# Patient Record
Sex: FEMALE | Race: WHITE | NOT HISPANIC OR LATINO | Employment: UNEMPLOYED | URBAN - METROPOLITAN AREA
[De-identification: names, ages, dates, MRNs, and addresses within clinical notes are randomized per-mention and may not be internally consistent; named-entity substitution may affect disease eponyms.]

---

## 2024-05-30 DIAGNOSIS — D24.2 BREAST ADENOMA, LEFT: ICD-10-CM

## 2024-05-30 DIAGNOSIS — N64.4 BREAST PAIN IN FEMALE: Primary | ICD-10-CM

## 2024-06-18 ENCOUNTER — OFFICE VISIT (OUTPATIENT)
Dept: SURGERY | Facility: CLINIC | Age: 44
End: 2024-06-18
Payer: COMMERCIAL

## 2024-06-18 VITALS
HEIGHT: 66 IN | WEIGHT: 154.38 LBS | BODY MASS INDEX: 24.81 KG/M2 | RESPIRATION RATE: 16 BRPM | HEART RATE: 66 BPM | DIASTOLIC BLOOD PRESSURE: 75 MMHG | OXYGEN SATURATION: 100 % | SYSTOLIC BLOOD PRESSURE: 108 MMHG | TEMPERATURE: 98 F

## 2024-06-18 DIAGNOSIS — N64.51 HARDNESS OF BREAST: ICD-10-CM

## 2024-06-18 DIAGNOSIS — Z98.890 HISTORY OF AUGMENTATION OF BOTH BREASTS: ICD-10-CM

## 2024-06-18 DIAGNOSIS — D24.2 BREAST ADENOMA, LEFT: Primary | ICD-10-CM

## 2024-06-18 DIAGNOSIS — N64.4 BREAST PAIN IN FEMALE: ICD-10-CM

## 2024-06-18 PROCEDURE — 1159F MED LIST DOCD IN RCRD: CPT | Mod: CPTII,S$GLB,, | Performed by: PHYSICIAN ASSISTANT

## 2024-06-18 PROCEDURE — 3078F DIAST BP <80 MM HG: CPT | Mod: CPTII,S$GLB,, | Performed by: PHYSICIAN ASSISTANT

## 2024-06-18 PROCEDURE — 99999 PR PBB SHADOW E&M-EST. PATIENT-LVL IV: CPT | Mod: PBBFAC,,, | Performed by: PHYSICIAN ASSISTANT

## 2024-06-18 PROCEDURE — 3008F BODY MASS INDEX DOCD: CPT | Mod: CPTII,S$GLB,, | Performed by: PHYSICIAN ASSISTANT

## 2024-06-18 PROCEDURE — 1160F RVW MEDS BY RX/DR IN RCRD: CPT | Mod: CPTII,S$GLB,, | Performed by: PHYSICIAN ASSISTANT

## 2024-06-18 PROCEDURE — 3074F SYST BP LT 130 MM HG: CPT | Mod: CPTII,S$GLB,, | Performed by: PHYSICIAN ASSISTANT

## 2024-06-18 PROCEDURE — 99205 OFFICE O/P NEW HI 60 MIN: CPT | Mod: S$GLB,,, | Performed by: PHYSICIAN ASSISTANT

## 2024-06-18 NOTE — PROGRESS NOTES
[facility] Breast Surg  Breast Surgical Oncology  New Patient Office Visit - H&P      Referring Provider: Dr. MARIA DEL ROSARIO Riddle*  PCP: Yasir Vo MD   Care Team:  Medical Oncologist: No care team member to display   Radiation Oncologist: No care team member to display   OBGYN: No data on file.    Chief Complaint:   Chief Complaint   Patient presents with    Breast Pain     Patient c/o intermittent dull left breast pain x 5 years no redness, swelling, no nipple discharge          Subjective:     HPI:  Asia Sen is a 43 y.o. female who presents on 2024 for evaluation of  a left breast lump and pain . Her symptoms first began in  when she developed a left breast lump while pregnant. The lump was evaluated in 2019, revealing 2 masses in the area of palpable concern, the largest measuring 3.3 cm.  The findings were felt to be most consistent with a lactating adenoma or fibroadenoma.  She continue close interval follow-up until 2019 when the masses were seen to have decreased in size, with the largest now measuring 2.1 cm.  Findings were deemed benign, and she has since continued mammograms and ultrasounds have been negative.  However, she feels that within the last year the lump has increased in size and complains of worsening left breast pain with associated generalized hardness to the entire left breast. Her breast history also includes bilateral breast augmentation with saline implants in . These have never been replaced. Other than the left breast lump, pain, and new generalized hardness to the breast, she currently denies any other breast issues including rashes, redness, swelling, or nipple discharge.    Imagin2019 BL DG MG and BL breast US at [facility] (patient was 39 weeks pregnant at this time) - BIRADS 3:   On mammogram in the subareolar left breast 6 o'clock position there is a 4.1 cm circumscribed ovoid mass.  On ultrasound, there is  a 3.3 x 2.1 x 3.1 cm fairly circumscribed oval hypoechoic mass with a small amount of internal cystic change.  Differential considerations include a lactating adenoma or fibroadenoma.  There is a similar adjacent 1.2 x 0.7 x 1.1 cm circumscribed oval hypoechoic mass.  These are probably benign.  Follow-up breast ultrasound is recommended in 6 weeks once patient is postpartum.  3/28/2019 BL breast US at [facility] - BIRADS 3:    At 5:00, there has been interval decrease in size of a mildly complex hypoechoic mass which now measures 2.8 x 1.2 x 2.8 cm.  This is likely a fibroadenoma.  There has also been interval decrease in size of the adjacent circumscribed hypoechoic mass which now measures 0.9 x 0.5 x 0.7 cm.  This is also likely a fibroadenoma.  Despite the decrease in size, continued follow up in 6 months is recommended.  10/1/2019 L breast US at [facility] - BIRADS 2:   Again demonstrates a fibroadenoma in the lower outer left breast 1 cm from the nipple.  On today's study this measures 2.1 x 2.1 x 1.1 cm which is a slight decrease in size when compared with the March study.  A 2nd fibroadenoma adjacent to the larger 1 measures less than 1 cm in greatest dimension.  2/28/2020 SCR MG - BIRADS 1: NEGATIVE  3/1/2021 SCR MG - BIRADS 0: INCOMPLETE:    Ms. Macedo masses which are adjacent to each other located in the left breast at 6:00 are again demonstrated, and they show no significant significant change, except that they are smaller.  However, patient says that the lump in the left breast is getting larger.  Therefore recommend additional imaging with ultrasound.  3/16/2021 L breast US - BIRADS 3:    In area of palpable concern in the 5 o'clock position 1 cm from the nipple, a pair of adjacent masses maintain stable benign characteristics of a fibroadenoma.  There has been some change in volume since the previous evaluations but they remain benign-appearing.  Today the larger mass measures 2.3 x 0.8 x 2.4 cm.  The smaller  measures 1.0 x 0.5 x 0.9 cm.  Follow-up ultrasound is recommended in 6 months.  2021 L breast US - BIRADS 2:    There is no significant change compared to the previous examinations.  The larger mass now measures 2.4 x 0.8 x 2.4 cm.  The smaller mass measures 1.0 x 0.5 x 0.9 cm.  They maintain characteristics of a benign fibroadenoma.  2022 SCR MG - BIRADS 1: NEGATIVE  2023 SCR MG - BIRADS 1: NEGATIVE    Pathology:       OB/GYN History:  Age at Menarche Onset: 13  Menopausal Status: premenopausal, LMP: Patient's last menstrual period was 2024 (exact date).  Hysterectomy/Oophorectomy: no, neither  Hormonal birth control (duration): 15 years total  Pregnancy History:   Age at first live birth: 38  Hormone Replacement Therapy: No, none    Other:  MG breast density: BIRADS B  Prior thoracic RT: none  Genetic testing: none  Ashkenazi [employer]ism descent: No    Family History:  Family History   Problem Relation Name Age of Onset    Cancer Mother Giulia         Kidney Cancer    Hypertension Mother Giulia     Stroke Mother Giulia         TIA (once)    Depression Mother Giulia     Early death Mother Giulia     Heart disease Mother Giulia     Miscarriages / Stillbirths Mother Giulia     Alcohol abuse Father Oseas     Drug abuse Father Oseas     Early death Father Oseas         Patient History:  History reviewed. No pertinent past medical history.    There are no problems to display for this patient.       Past Surgical History:   Procedure Laterality Date    AUGMENTATION OF BREAST  2006    Under Muscle Saline    COSMETIC SURGERY  2006    Breast Implants       Social History     Socioeconomic History    Marital status:    Tobacco Use    Smoking status: Former     Types: Cigarettes    Smokeless tobacco: Never    Tobacco comments:     Occasionally on and off in my 20's and 30's quit at 37   Substance and Sexual Activity    Alcohol use: Not Currently     Comment: Occasionally not  "daily nor weekly    Drug use: Never    Sexual activity: Yes     Partners: Male     Birth control/protection: Partner-Vasectomy     Comment:          There is no immunization history on file for this patient.    Medications/Allergies:  No current outpatient medications on file.     Review of patient's allergies indicates:  Not on File    Review of Systems:  ROS     Objective:     Vitals:  Vitals:    06/18/24 1000   BP: 108/75   BP Location: Right arm   Patient Position: Sitting   BP Method: Medium (Automatic)   Pulse: 66   Resp: 16   Temp: 97.8 °F (36.6 °C)   TempSrc: Oral   SpO2: 100%   Weight: 70 kg (154 lb 6.4 oz)   Height: 5' 6" (1.676 m)       Body mass index is 24.92 kg/m².     Physical Exam:  General: The patient is awake, alert and oriented times three. The patient is well nourished and in no acute distress.  Neck: There is no evidence of palpable cervical, supraclavicular or axillary adenopathy. The neck is supple. The thyroid is not enlarged.  Musculoskeletal: The patient has a normal range of motion of her bilateral upper extremities.  Chest: Examination of the chest wall fails to reveal any obvious abnormalities. Nonlabored breathing, symmetric expansion.  Breast:  Right:  Examination of right breast fails to reveal any dominant masses or areas of significant focal nodularity. The nipple is everted without evidence of discharge. There is no skin dimpling with movement of the pectoralis. There are no significant skin changes overlying the breast.   Left: There is a 3-4 cm firm, freely-mobile lump in the 6:00 periareolar position in the patient's area of palpable concern. There is also generalized hardness of the entire left breast with associated tenderness to palpation. Examination of the left breast fails to reveal any dominant masses or areas of significant focal nodularity. The nipple is everted without evidence of discharge. There is no skin dimpling with movement of the pectoralis. There are " no significant skin changes overlying the breast.  Abdomen: The abdomen is soft, flat, nontender and nondistended.  Integumentary: no rashes or skin lesions present  Neurologic: cranial nerves intact, no signs of peripheral neurological deficit, motor/sensory function intact      Assessment:     There is no problem list on file for this patient.       Asia was seen today for breast pain.    Diagnoses and all orders for this visit:    Breast adenoma, left  -     Ambulatory referral/consult to Breast Surgery  -     US Breast Left Complete; Future  -     Mammo Digital Diagnostic Bilat with Michael; Future  -     US Breast Left Complete  -     Mammo Digital Diagnostic Bilat with Michael    Breast pain in female  -     Ambulatory referral/consult to Breast Surgery  -     US Breast Left Complete; Future  -     Mammo Digital Diagnostic Bilat with Michael; Future  -     US Breast Left Complete  -     Mammo Digital Diagnostic Bilat with Michael    Hardness of breast  -     US Breast Left Complete; Future  -     Mammo Digital Diagnostic Bilat with Michael; Future  -     US Breast Left Complete  -     Mammo Digital Diagnostic Bilat with Michael    History of augmentation of both breasts       Discussed excision of left breast adenoma given it has become increasingly symptomatic and bothersome to patient. Would first recommend breast imaging to re-evaluate this mass which is patient feels to have enlarged and become more tender since her last exam in August 2023.     Regarding generalized hardness to the left breast and pain, I discussed possibility of capsular contracture causing these symptoms. Diagnostic breast imaging may be helpful to evaluate generalized hardness/pain to the left breast concerning for possible implant contracture, but sometimes a breast MRI is needed to fully evaluate for this. Would help to have opinion from plastic surgeon as well.      Plan:      BL DG MG and L breast US at [facility] to re-evaluate left breast mass which is  patient feels to have enlarged and become more tender since her last exam in August 2023. Will also recommend this imaging to evaluate generalized hardness/pain to the left breast concerning for possible implant contracture. Please send office notes with imaging order.    Referral to Dr. Simons for evaluation of possible left implant contracture and consideration for implant exchange (possibly at time of excision of left breast mass).    RTC after imaging for follow up with Dr. Barrera for pre-op.      CC: Dr. Riddle      All of her questions were answered. She was advised to call if she develops any questions or concerns.    Yolanda Lopez PA-C       --------------------------------------------------------------------------------------------------------------  Total time on the date of the visit ranged from 60-74 mins (80169). Total time includes both face-to-face and non-face-to-face time personally spent by myself on the day of the visit.    Non-face-to-face time included:  _X_ preparing to see the patient such as reviewing the patient record  _X_ obtaining and reviewing separately obtained history  _X_ independently interpreting results  _X_ documenting clinical information in electronic health record.    Face-to-face time included:  _X_ performing an appropriate history and examination  _X_ communicating results to the patient  _X_ counseling and educating the patient  __ ordering appropriate medications  _x_ ordering appropriate tests  _X_ ordering appropriate procedures (including follow-up)  _X_ answering any questions the patient had    Total Time spent on date of visit: 70 minutes

## 2024-07-22 DIAGNOSIS — N63.10 MASS OF RIGHT BREAST, UNSPECIFIED QUADRANT: Primary | ICD-10-CM

## 2024-08-26 NOTE — PROGRESS NOTES
[facility] Breast Surg  Breast Surgical Oncology  New Patient Office Visit - H&P      Referring Provider: Dr. MARIA DEL ROSARIO Riddle  PCP: Yasir Vo MD   Care Team:  OBGYN: Dr. MARIA DEL ROSARIO Riddle    Chief Complaint:   Chief Complaint   Patient presents with    Pre-op Exam     Patient reports no breast related concerns         Subjective:     Interval History:  08/27/2024 - Asia Sen returns today for surgical consultation. She has undergone repeat imaging which revealed again a retroareolar mass but that what was previously seen as two breast masses has now converged into one mass. It has enlarged and currently measuring 5.6 cm. This was biopsied and pathology showed a complex fibroadenoma. The mass remains tender and bothersome to the patient.     HPI:  Asia Sen is a 43 y.o. female who presents on 6/18/2024 for evaluation of  a left breast lump and pain . Her symptoms first began in 2019 when she developed a left breast lump while pregnant. The lump was evaluated in February 2019, revealing 2 masses in the area of palpable concern, the largest measuring 3.3 cm.  The findings were felt to be most consistent with a lactating adenoma or fibroadenoma.  She continue close interval follow-up until October 2019 when the masses were seen to have decreased in size, with the largest now measuring 2.1 cm.  Findings were deemed benign, and she has since continued mammograms and ultrasounds have been negative.  However, she feels that within the last year the lump has increased in size and complains of worsening left breast pain with associated generalized hardness to the entire left breast. Her breast history also includes bilateral breast augmentation with saline implants in 2006. These have never been replaced. Other than the left breast lump, pain, and new generalized hardness to the breast, she currently denies any other breast issues including rashes, redness, swelling, or  nipple discharge.    Imagin2019 BL DG MG and BL breast US at [facility] (patient was 39 weeks pregnant at this time) - BIRADS 3:   On mammogram in the subareolar left breast 6 o'clock position there is a 4.1 cm circumscribed ovoid mass.  On ultrasound, there is a 3.3 x 2.1 x 3.1 cm fairly circumscribed oval hypoechoic mass with a small amount of internal cystic change.  Differential considerations include a lactating adenoma or fibroadenoma.  There is a similar adjacent 1.2 x 0.7 x 1.1 cm circumscribed oval hypoechoic mass.  These are probably benign.  Follow-up breast ultrasound is recommended in 6 weeks once patient is postpartum.  3/28/2019 BL breast US at [facility] - BIRADS 3:    At 5:00, there has been interval decrease in size of a mildly complex hypoechoic mass which now measures 2.8 x 1.2 x 2.8 cm.  This is likely a fibroadenoma.  There has also been interval decrease in size of the adjacent circumscribed hypoechoic mass which now measures 0.9 x 0.5 x 0.7 cm.  This is also likely a fibroadenoma.  Despite the decrease in size, continued follow up in 6 months is recommended.  10/1/2019 L breast US at [facility] - BIRADS 2:   Again demonstrates a fibroadenoma in the lower outer left breast 1 cm from the nipple.  On today's study this measures 2.1 x 2.1 x 1.1 cm which is a slight decrease in size when compared with the March study.  A 2nd fibroadenoma adjacent to the larger 1 measures less than 1 cm in greatest dimension.  2020 SCR MG - BIRADS 1: NEGATIVE  3/1/2021 SCR MG - BIRADS 0: INCOMPLETE:    Ms. Macedo masses which are adjacent to each other located in the left breast at 6:00 are again demonstrated, and they show no significant significant change, except that they are smaller.  However, patient says that the lump in the left breast is getting larger.  Therefore recommend additional imaging with ultrasound.  3/16/2021 L breast US - BIRADS 3:    In area of palpable concern in the 5 o'clock position 1 cm from the  "nipple, a pair of adjacent masses maintain stable benign characteristics of a fibroadenoma.  There has been some change in volume since the previous evaluations but they remain benign-appearing.  Today the larger mass measures 2.3 x 0.8 x 2.4 cm.  The smaller measures 1.0 x 0.5 x 0.9 cm.  Follow-up ultrasound is recommended in 6 months.  09/16/2021 L breast US - BIRADS 2:    There is no significant change compared to the previous examinations.  The larger mass now measures 2.4 x 0.8 x 2.4 cm.  The smaller mass measures 1.0 x 0.5 x 0.9 cm.  They maintain characteristics of a benign fibroadenoma.  8/24/2022 SCR MG - BIRADS 1: NEGATIVE  8/25/2023 SCR MG - BIRADS 1: NEGATIVE  8/12/2024 BL DG MG and BL breast US at [facility] - BIRADS 4:   On mammography, there is a lobulated mass in the left breast at 5-6:00 corresponding to the palpable area. It is more accurately measured by US. This mass has grown in size since previous mammograms 8/24/2022 and 8/25/2023. It shows characteristics of a benign fibroadenoma by mammography and US 3/15/2021 and 9/16/2021. There are no other significant findings.  On US of the left breast, there is a multilobulated mass in the left breast centered at 5:00 corresponding to the palpable area of concern. It shows characteristics of a fibroadenoma and measures 5.6 x 1.8 x 3.8 cm. Previously this was shown to be two masses. These two masses are now in contact with each other and appear to be one mass. Previously, the two masses measured 2.4 x 0.8 x 2.4 cm and 1.0 x 0.5 x 0.9 cm. US guided biopsy of the left breast mass is recommended.  There are no findings which are suspicious for malignancy in the right breast. The area of concern in the right breast at 6:00 corresponds to benign-appearing fibroglandular and fibrofatty tissue.    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  Hysterectomy/Oophorectomy: no, neither  Hormonal birth control (duration): 15 years total  Pregnancy History:   Age at first live birth: 38  Hormone Replacement Therapy: No, none    Other:  MG breast density: BIRADS B  Prior thoracic RT: none  Genetic testing: none  Ashkenazi Nondenominational descent: No    Family History:  Family History   Problem Relation Name Age of Onset    Cancer Mother Giulia         Kidney Cancer    Hypertension Mother Giulia     Stroke Mother Giulia         TIA    Depression Mother Giulia     Early death Mother Giulia     Heart disease Mother Giulia     Miscarriages / Stillbirths Mother Giulia     Alcohol abuse Father Oseas     Drug abuse Father Oseas     Early death Father Oseas         Patient History:  History reviewed. No pertinent past medical history.    There are no problems to display for this patient.       Past Surgical History:   Procedure Laterality Date    AUGMENTATION OF BREAST  2006    Under Muscle Saline    COSMETIC SURGERY  2006    Breast Implants       Social History     Socioeconomic History    Marital status:    Tobacco Use    Smoking status: Former     Current packs/day: 0.00     Types: Cigarettes     Quit date: 2018     Years since quittin.2    Smokeless tobacco: Never    Tobacco comments:     Occasionally on and off in my 20s and 30s quit at 37   Substance and Sexual Activity    Alcohol use: Not Currently     Comment: Occasionally not daily nor weekly    Drug use: Never    Sexual activity: Yes     Partners: Male     Birth control/protection: Partner-Vasectomy     Comment:          There is no immunization history on file for this patient.    Medications/Allergies:  No current outpatient medications on file.     Review of patient's allergies indicates:  No Known Allergies    Review of  "Systems:  Review of Systems   Constitutional:  Negative for chills, fever, malaise/fatigue and weight loss.   HENT:  Negative for congestion and sore throat.    Eyes:  Negative for blurred vision and double vision.   Respiratory:  Negative for cough, hemoptysis, shortness of breath and wheezing.    Cardiovascular:  Negative for chest pain, palpitations and leg swelling.   Gastrointestinal:  Negative for abdominal pain, blood in stool, constipation, diarrhea, heartburn, melena, nausea and vomiting.   Genitourinary:  Negative for dysuria, flank pain, frequency and hematuria.   Musculoskeletal:  Negative for falls, joint pain and myalgias.   Skin:  Negative for itching and rash.   Neurological:  Negative for dizziness, sensory change, focal weakness, seizures and headaches.   Endo/Heme/Allergies:  Negative for environmental allergies. Does not bruise/bleed easily.   Psychiatric/Behavioral:  Negative for depression. The patient is not nervous/anxious.         Objective:     Vitals:  Vitals:    08/27/24 1042   BP: 110/78   BP Location: Right arm   Patient Position: Sitting   BP Method: Medium (Automatic)   Pulse: 69   Resp: 18   Temp: 98.3 °F (36.8 °C)   TempSrc: Oral   SpO2: 97%   Weight: 65.8 kg (145 lb)   Height: 5' 6" (1.676 m)      Body mass index is 23.4 kg/m².     Physical Exam:  General: The patient is awake, alert and oriented times three. The patient is well nourished and in no acute distress.  Neck: There is no evidence of palpable cervical, supraclavicular or axillary adenopathy. The neck is supple. The thyroid is not enlarged.  Musculoskeletal: The patient has a normal range of motion of her bilateral upper extremities.  Chest: Examination of the chest wall fails to reveal any obvious abnormalities. Nonlabored breathing, symmetric expansion.  Breast:  Right:   Implant present.  Examination of right breast fails to reveal any dominant masses or areas of significant focal nodularity. The nipple is everted " without evidence of discharge. There is no skin dimpling with movement of the pectoralis. There are no significant skin changes overlying the breast.   Left: There is an oval shaped 4-5 cm firm, freely-mobile lump in the 6:00 periareolar position in the patient's area of palpable concern. Mild resolving ecchymosis noted to the skin near the lump s/t recent needle biopsy. Implant present. Examination of the left breast fails to reveal any dominant masses or areas of significant focal nodularity. The nipple is everted without evidence of discharge. There is no skin dimpling with movement of the pectoralis. There are no significant skin changes overlying the breast.  Abdomen: The abdomen is soft, flat, nontender and nondistended.  Integumentary: no rashes or skin lesions present  Neurologic: cranial nerves intact, no signs of peripheral neurological deficit, motor/sensory function intact      Assessment:     There is no problem list on file for this patient.       Asia was seen today for pre-op exam.    Diagnoses and all orders for this visit:    Subareolar mass of left breast  -     Place in Outpatient; Standing  -     Case Request Operating Room: EXCISION,MASS,BREAST,USING RADIOLOGICAL MARKER  -     Vital signs; Standing  -     Insert peripheral IV; Standing  -     Height and weight; Standing  -     Intake and output; Standing  -     Verify discontinuation of antithrombotics; Standing  -     Verify consent; Standing  -     Chlorhexidine (CHG) 2% Wipes; Standing  -     Notify Physician; Standing  -     Diet NPO; Standing  -     lactated ringers infusion  -     IP VTE LOW RISK PATIENT; Standing  -     ceFAZolin (ANCEF) 2 g in D5W 50 mL IVPB  -     Pulse Oximetry Q4H; Standing  -     Full code; Standing  -     Insert peripheral IV    Left breast mass    Pre-op testing  -     Basic metabolic panel; Future  -     CBC auto differential; Standing  -     POCT urine pregnancy  -     CBC auto differential    Fibroadenoma,  left    History of augmentation of both breasts       I discussed imaging and pathology findings with the patient in detail. She has undergone repeat imaging which revealed again a retroareolar mass but that what was previously seen as two breast masses has now converged into one mass. It has enlarged and currently measuring 5.6 cm. This was biopsied and pathology showed a complex fibroadenoma. The mass remains tender and bothersome to the patient. Discussed excision of left breast complex fibroadenoma given it has enlarged and become increasingly symptomatic and bothersome to patient.     I informed her that a excisional biopsy can be done on outpatient basis under general anesthesia. The risks and complications of pain, bleeding, infection, hematoma, seroma, additional surgery, and scarring were explained. The details of the surgery were described in depth. All of the patient's questions were answered.         Plan:      Surgery recommended: Left Breast Excisional Biopsy, tentatively 9/5/2024   - Pre-op Testing: CBC, BMP, UPT       CC: Dr. OLIVIA Riddle, Dr. Yasir Vo      All of her questions were answered. She was advised to call if she develops any questions or concerns.    Yolanda Lopez PA-C       --------------------------------------------------------------------------------------------------------------  --------------------------------------------------------------------------------------------------------------  Total time on the date of the visit ranged from 40-54 mins (52827). Total time includes both face-to-face and non-face-to-face time personally spent by myself on the day of the visit.    Non-face-to-face time included:  _X_ preparing to see the patient such as reviewing the patient record  __ obtaining and reviewing separately obtained history  _X_ independently interpreting results  _X_ documenting clinical information in electronic health record.    Face-to-face time  included:  _X_ performing an appropriate history and examination  _X_ communicating results to the patient  _X_ counseling and educating the patient  _x_ ordering appropriate medications  _x_ ordering appropriate tests  _X_ ordering appropriate procedures (including follow-up)  _X_ answering any questions the patient had    Total Time spent on date of visit: 45 minutes

## 2024-08-26 NOTE — H&P (VIEW-ONLY)
[facility] Breast Surg  Breast Surgical Oncology  New Patient Office Visit - H&P      Referring Provider: Dr. MARIA DEL ROSARIO Riddle  PCP: Yasir Vo MD   Care Team:  OBGYN: Dr. MARIA DEL ROSARIO Riddle    Chief Complaint:   Chief Complaint   Patient presents with    Pre-op Exam     Patient reports no breast related concerns         Subjective:     Interval History:  08/27/2024 - Asia Sen returns today for surgical consultation. She has undergone repeat imaging which revealed again a retroareolar mass but that what was previously seen as two breast masses has now converged into one mass. It has enlarged and currently measuring 5.6 cm. This was biopsied and pathology showed a complex fibroadenoma. The mass remains tender and bothersome to the patient.     HPI:  Asia Sen is a 43 y.o. female who presents on 6/18/2024 for evaluation of  a left breast lump and pain . Her symptoms first began in 2019 when she developed a left breast lump while pregnant. The lump was evaluated in February 2019, revealing 2 masses in the area of palpable concern, the largest measuring 3.3 cm.  The findings were felt to be most consistent with a lactating adenoma or fibroadenoma.  She continue close interval follow-up until October 2019 when the masses were seen to have decreased in size, with the largest now measuring 2.1 cm.  Findings were deemed benign, and she has since continued mammograms and ultrasounds have been negative.  However, she feels that within the last year the lump has increased in size and complains of worsening left breast pain with associated generalized hardness to the entire left breast. Her breast history also includes bilateral breast augmentation with saline implants in 2006. These have never been replaced. Other than the left breast lump, pain, and new generalized hardness to the breast, she currently denies any other breast issues including rashes, redness, swelling, or  nipple discharge.    Imagin2019 BL DG MG and BL breast US at [facility] (patient was 39 weeks pregnant at this time) - BIRADS 3:   On mammogram in the subareolar left breast 6 o'clock position there is a 4.1 cm circumscribed ovoid mass.  On ultrasound, there is a 3.3 x 2.1 x 3.1 cm fairly circumscribed oval hypoechoic mass with a small amount of internal cystic change.  Differential considerations include a lactating adenoma or fibroadenoma.  There is a similar adjacent 1.2 x 0.7 x 1.1 cm circumscribed oval hypoechoic mass.  These are probably benign.  Follow-up breast ultrasound is recommended in 6 weeks once patient is postpartum.  3/28/2019 BL breast US at [facility] - BIRADS 3:    At 5:00, there has been interval decrease in size of a mildly complex hypoechoic mass which now measures 2.8 x 1.2 x 2.8 cm.  This is likely a fibroadenoma.  There has also been interval decrease in size of the adjacent circumscribed hypoechoic mass which now measures 0.9 x 0.5 x 0.7 cm.  This is also likely a fibroadenoma.  Despite the decrease in size, continued follow up in 6 months is recommended.  10/1/2019 L breast US at [facility] - BIRADS 2:   Again demonstrates a fibroadenoma in the lower outer left breast 1 cm from the nipple.  On today's study this measures 2.1 x 2.1 x 1.1 cm which is a slight decrease in size when compared with the March study.  A 2nd fibroadenoma adjacent to the larger 1 measures less than 1 cm in greatest dimension.  2020 SCR MG - BIRADS 1: NEGATIVE  3/1/2021 SCR MG - BIRADS 0: INCOMPLETE:    Ms. Macedo masses which are adjacent to each other located in the left breast at 6:00 are again demonstrated, and they show no significant significant change, except that they are smaller.  However, patient says that the lump in the left breast is getting larger.  Therefore recommend additional imaging with ultrasound.  3/16/2021 L breast US - BIRADS 3:    In area of palpable concern in the 5 o'clock position 1 cm from the  "nipple, a pair of adjacent masses maintain stable benign characteristics of a fibroadenoma.  There has been some change in volume since the previous evaluations but they remain benign-appearing.  Today the larger mass measures 2.3 x 0.8 x 2.4 cm.  The smaller measures 1.0 x 0.5 x 0.9 cm.  Follow-up ultrasound is recommended in 6 months.  09/16/2021 L breast US - BIRADS 2:    There is no significant change compared to the previous examinations.  The larger mass now measures 2.4 x 0.8 x 2.4 cm.  The smaller mass measures 1.0 x 0.5 x 0.9 cm.  They maintain characteristics of a benign fibroadenoma.  8/24/2022 SCR MG - BIRADS 1: NEGATIVE  8/25/2023 SCR MG - BIRADS 1: NEGATIVE  8/12/2024 BL DG MG and BL breast US at [facility] - BIRADS 4:   On mammography, there is a lobulated mass in the left breast at 5-6:00 corresponding to the palpable area. It is more accurately measured by US. This mass has grown in size since previous mammograms 8/24/2022 and 8/25/2023. It shows characteristics of a benign fibroadenoma by mammography and US 3/15/2021 and 9/16/2021. There are no other significant findings.  On US of the left breast, there is a multilobulated mass in the left breast centered at 5:00 corresponding to the palpable area of concern. It shows characteristics of a fibroadenoma and measures 5.6 x 1.8 x 3.8 cm. Previously this was shown to be two masses. These two masses are now in contact with each other and appear to be one mass. Previously, the two masses measured 2.4 x 0.8 x 2.4 cm and 1.0 x 0.5 x 0.9 cm. US guided biopsy of the left breast mass is recommended.  There are no findings which are suspicious for malignancy in the right breast. The area of concern in the right breast at 6:00 corresponds to benign-appearing fibroglandular and fibrofatty tissue.    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  Hysterectomy/Oophorectomy: no, neither  Hormonal birth control (duration): 15 years total  Pregnancy History:   Age at first live birth: 38  Hormone Replacement Therapy: No, none    Other:  MG breast density: BIRADS B  Prior thoracic RT: none  Genetic testing: none  Ashkenazi [employer] descent: No    Family History:  Family History   Problem Relation Name Age of Onset    Cancer Mother Giulia         Kidney Cancer    Hypertension Mother Giulia     Stroke Mother Giulia         TIA    Depression Mother Giulia     Early death Mother Giulia     Heart disease Mother Giulia     Miscarriages / Stillbirths Mother Giulia     Alcohol abuse Father Oseas     Drug abuse Father Oseas     Early death Father Oseas         Patient History:  History reviewed. No pertinent past medical history.    There are no problems to display for this patient.       Past Surgical History:   Procedure Laterality Date    AUGMENTATION OF BREAST  2006    Under Muscle Saline    COSMETIC SURGERY  2006    Breast Implants       Social History     Socioeconomic History    Marital status:    Tobacco Use    Smoking status: Former     Current packs/day: 0.00     Types: Cigarettes     Quit date: 2018     Years since quittin.2    Smokeless tobacco: Never    Tobacco comments:     Occasionally on and off in my 20s and 30s quit at 37   Substance and Sexual Activity    Alcohol use: Not Currently     Comment: Occasionally not daily nor weekly    Drug use: Never    Sexual activity: Yes     Partners: Male     Birth control/protection: Partner-Vasectomy     Comment:          There is no immunization history on file for this patient.    Medications/Allergies:  No current outpatient medications on file.     Review of patient's allergies indicates:  No Known Allergies    Review of  "Systems:  Review of Systems   Constitutional:  Negative for chills, fever, malaise/fatigue and weight loss.   HENT:  Negative for congestion and sore throat.    Eyes:  Negative for blurred vision and double vision.   Respiratory:  Negative for cough, hemoptysis, shortness of breath and wheezing.    Cardiovascular:  Negative for chest pain, palpitations and leg swelling.   Gastrointestinal:  Negative for abdominal pain, blood in stool, constipation, diarrhea, heartburn, melena, nausea and vomiting.   Genitourinary:  Negative for dysuria, flank pain, frequency and hematuria.   Musculoskeletal:  Negative for falls, joint pain and myalgias.   Skin:  Negative for itching and rash.   Neurological:  Negative for dizziness, sensory change, focal weakness, seizures and headaches.   Endo/Heme/Allergies:  Negative for environmental allergies. Does not bruise/bleed easily.   Psychiatric/Behavioral:  Negative for depression. The patient is not nervous/anxious.         Objective:     Vitals:  Vitals:    08/27/24 1042   BP: 110/78   BP Location: Right arm   Patient Position: Sitting   BP Method: Medium (Automatic)   Pulse: 69   Resp: 18   Temp: 98.3 °F (36.8 °C)   TempSrc: Oral   SpO2: 97%   Weight: 65.8 kg (145 lb)   Height: 5' 6" (1.676 m)      Body mass index is 23.4 kg/m².     Physical Exam:  General: The patient is awake, alert and oriented times three. The patient is well nourished and in no acute distress.  Neck: There is no evidence of palpable cervical, supraclavicular or axillary adenopathy. The neck is supple. The thyroid is not enlarged.  Musculoskeletal: The patient has a normal range of motion of her bilateral upper extremities.  Chest: Examination of the chest wall fails to reveal any obvious abnormalities. Nonlabored breathing, symmetric expansion.  Breast:  Right:   Implant present.  Examination of right breast fails to reveal any dominant masses or areas of significant focal nodularity. The nipple is everted " without evidence of discharge. There is no skin dimpling with movement of the pectoralis. There are no significant skin changes overlying the breast.   Left: There is an oval shaped 4-5 cm firm, freely-mobile lump in the 6:00 periareolar position in the patient's area of palpable concern. Mild resolving ecchymosis noted to the skin near the lump s/t recent needle biopsy. Implant present. Examination of the left breast fails to reveal any dominant masses or areas of significant focal nodularity. The nipple is everted without evidence of discharge. There is no skin dimpling with movement of the pectoralis. There are no significant skin changes overlying the breast.  Abdomen: The abdomen is soft, flat, nontender and nondistended.  Integumentary: no rashes or skin lesions present  Neurologic: cranial nerves intact, no signs of peripheral neurological deficit, motor/sensory function intact      Assessment:     There is no problem list on file for this patient.       Asia was seen today for pre-op exam.    Diagnoses and all orders for this visit:    Subareolar mass of left breast  -     Place in Outpatient; Standing  -     Case Request Operating Room: EXCISION,MASS,BREAST,USING RADIOLOGICAL MARKER  -     Vital signs; Standing  -     Insert peripheral IV; Standing  -     Height and weight; Standing  -     Intake and output; Standing  -     Verify discontinuation of antithrombotics; Standing  -     Verify consent; Standing  -     Chlorhexidine (CHG) 2% Wipes; Standing  -     Notify Physician; Standing  -     Diet NPO; Standing  -     lactated ringers infusion  -     IP VTE LOW RISK PATIENT; Standing  -     ceFAZolin (ANCEF) 2 g in D5W 50 mL IVPB  -     Pulse Oximetry Q4H; Standing  -     Full code; Standing  -     Insert peripheral IV    Left breast mass    Pre-op testing  -     Basic metabolic panel; Future  -     CBC auto differential; Standing  -     POCT urine pregnancy  -     CBC auto differential    Fibroadenoma,  left    History of augmentation of both breasts       I discussed imaging and pathology findings with the patient in detail. She has undergone repeat imaging which revealed again a retroareolar mass but that what was previously seen as two breast masses has now converged into one mass. It has enlarged and currently measuring 5.6 cm. This was biopsied and pathology showed a complex fibroadenoma. The mass remains tender and bothersome to the patient. Discussed excision of left breast complex fibroadenoma given it has enlarged and become increasingly symptomatic and bothersome to patient.     I informed her that a excisional biopsy can be done on outpatient basis under general anesthesia. The risks and complications of pain, bleeding, infection, hematoma, seroma, additional surgery, and scarring were explained. The details of the surgery were described in depth. All of the patient's questions were answered.         Plan:      Surgery recommended: Left Breast Excisional Biopsy, tentatively 9/5/2024   - Pre-op Testing: CBC, BMP, UPT       CC: Dr. OLIVIA Riddle, Dr. Yasir Vo      All of her questions were answered. She was advised to call if she develops any questions or concerns.    Yolanda Lopez PA-C       --------------------------------------------------------------------------------------------------------------  --------------------------------------------------------------------------------------------------------------  Total time on the date of the visit ranged from 40-54 mins ([id number]). Total time includes both face-to-face and non-face-to-face time personally spent by myself on the day of the visit.    Non-face-to-face time included:  _X_ preparing to see the patient such as reviewing the patient record  __ obtaining and reviewing separately obtained history  _X_ independently interpreting results  _X_ documenting clinical information in electronic health record.    Face-to-face time  included:  _X_ performing an appropriate history and examination  _X_ communicating results to the patient  _X_ counseling and educating the patient  _x_ ordering appropriate medications  _x_ ordering appropriate tests  _X_ ordering appropriate procedures (including follow-up)  _X_ answering any questions the patient had    Total Time spent on date of visit: 45 minutes

## 2024-08-27 ENCOUNTER — OFFICE VISIT (OUTPATIENT)
Dept: SURGERY | Facility: CLINIC | Age: 44
End: 2024-08-27
Payer: COMMERCIAL

## 2024-08-27 ENCOUNTER — LAB VISIT (OUTPATIENT)
Dept: LAB | Facility: HOSPITAL | Age: 44
End: 2024-08-27
Attending: SURGERY
Payer: COMMERCIAL

## 2024-08-27 VITALS
WEIGHT: 145 LBS | OXYGEN SATURATION: 97 % | DIASTOLIC BLOOD PRESSURE: 78 MMHG | HEIGHT: 66 IN | SYSTOLIC BLOOD PRESSURE: 110 MMHG | RESPIRATION RATE: 18 BRPM | HEART RATE: 69 BPM | TEMPERATURE: 98 F | BODY MASS INDEX: 23.3 KG/M2

## 2024-08-27 DIAGNOSIS — Z98.890 HISTORY OF AUGMENTATION OF BOTH BREASTS: ICD-10-CM

## 2024-08-27 DIAGNOSIS — Z01.818 PRE-OP TESTING: ICD-10-CM

## 2024-08-27 DIAGNOSIS — D24.2 FIBROADENOMA, LEFT: ICD-10-CM

## 2024-08-27 DIAGNOSIS — N63.42 SUBAREOLAR MASS OF LEFT BREAST: Primary | ICD-10-CM

## 2024-08-27 DIAGNOSIS — N63.20 LEFT BREAST MASS: ICD-10-CM

## 2024-08-27 LAB
ANION GAP SERPL CALC-SCNC: 7 MEQ/L
BASOPHILS # BLD AUTO: 0.02 X10(3)/MCL
BASOPHILS NFR BLD AUTO: 0.5 %
BUN SERPL-MCNC: 15.7 MG/DL (ref 7–18.7)
CALCIUM SERPL-MCNC: 9.5 MG/DL (ref 8.4–10.2)
CHLORIDE SERPL-SCNC: 105 MMOL/L (ref 98–107)
CO2 SERPL-SCNC: 25 MMOL/L (ref 22–29)
CREAT SERPL-MCNC: 0.72 MG/DL (ref 0.55–1.02)
CREAT/UREA NIT SERPL: 22
EOSINOPHIL # BLD AUTO: 0.03 X10(3)/MCL (ref 0–0.9)
EOSINOPHIL NFR BLD AUTO: 0.8 %
ERYTHROCYTE [DISTWIDTH] IN BLOOD BY AUTOMATED COUNT: 13.1 % (ref 11.5–17)
GFR SERPLBLD CREATININE-BSD FMLA CKD-EPI: >60 ML/MIN/1.73/M2
GLUCOSE SERPL-MCNC: 82 MG/DL (ref 74–100)
HCT VFR BLD AUTO: 33.9 % (ref 37–47)
HGB BLD-MCNC: 11.6 G/DL (ref 12–16)
IMM GRANULOCYTES # BLD AUTO: 0.01 X10(3)/MCL (ref 0–0.04)
IMM GRANULOCYTES NFR BLD AUTO: 0.3 %
LYMPHOCYTES # BLD AUTO: 1.45 X10(3)/MCL (ref 0.6–4.6)
LYMPHOCYTES NFR BLD AUTO: 37.5 %
MCH RBC QN AUTO: 30.9 PG (ref 27–31)
MCHC RBC AUTO-ENTMCNC: 34.2 G/DL (ref 33–36)
MCV RBC AUTO: 90.2 FL (ref 80–94)
MONOCYTES # BLD AUTO: 0.24 X10(3)/MCL (ref 0.1–1.3)
MONOCYTES NFR BLD AUTO: 6.2 %
NEUTROPHILS # BLD AUTO: 2.12 X10(3)/MCL (ref 2.1–9.2)
NEUTROPHILS NFR BLD AUTO: 54.7 %
NRBC BLD AUTO-RTO: 0 %
PLATELET # BLD AUTO: 244 X10(3)/MCL (ref 130–400)
PMV BLD AUTO: 9.9 FL (ref 7.4–10.4)
POTASSIUM SERPL-SCNC: 3.9 MMOL/L (ref 3.5–5.1)
RBC # BLD AUTO: 3.76 X10(6)/MCL (ref 4.2–5.4)
SODIUM SERPL-SCNC: 137 MMOL/L (ref 136–145)
WBC # BLD AUTO: 3.87 X10(3)/MCL (ref 4.5–11.5)

## 2024-08-27 PROCEDURE — 36415 COLL VENOUS BLD VENIPUNCTURE: CPT

## 2024-08-27 PROCEDURE — 99215 OFFICE O/P EST HI 40 MIN: CPT | Mod: S$GLB,,, | Performed by: PHYSICIAN ASSISTANT

## 2024-08-27 PROCEDURE — 3074F SYST BP LT 130 MM HG: CPT | Mod: CPTII,S$GLB,, | Performed by: PHYSICIAN ASSISTANT

## 2024-08-27 PROCEDURE — 1159F MED LIST DOCD IN RCRD: CPT | Mod: CPTII,S$GLB,, | Performed by: PHYSICIAN ASSISTANT

## 2024-08-27 PROCEDURE — 3078F DIAST BP <80 MM HG: CPT | Mod: CPTII,S$GLB,, | Performed by: PHYSICIAN ASSISTANT

## 2024-08-27 PROCEDURE — 99999 PR PBB SHADOW E&M-EST. PATIENT-LVL V: CPT | Mod: PBBFAC,,, | Performed by: PHYSICIAN ASSISTANT

## 2024-08-27 PROCEDURE — 1160F RVW MEDS BY RX/DR IN RCRD: CPT | Mod: CPTII,S$GLB,, | Performed by: PHYSICIAN ASSISTANT

## 2024-08-27 PROCEDURE — 3008F BODY MASS INDEX DOCD: CPT | Mod: CPTII,S$GLB,, | Performed by: PHYSICIAN ASSISTANT

## 2024-08-27 RX ORDER — SODIUM CHLORIDE, SODIUM LACTATE, POTASSIUM CHLORIDE, CALCIUM CHLORIDE 600; 310; 30; 20 MG/100ML; MG/100ML; MG/100ML; MG/100ML
INJECTION, SOLUTION INTRAVENOUS CONTINUOUS
OUTPATIENT
Start: 2024-08-27

## 2024-09-05 ENCOUNTER — HOSPITAL ENCOUNTER (OUTPATIENT)
Facility: HOSPITAL | Age: 44
Discharge: HOME | End: 2024-09-05
Attending: SURGERY | Admitting: SURGERY
Payer: COMMERCIAL

## 2024-09-05 ENCOUNTER — HOSPITAL ENCOUNTER (OUTPATIENT)
Dept: RADIOLOGY | Facility: HOSPITAL | Age: 44
Discharge: HOME | End: 2024-09-05
Attending: SURGERY | Admitting: SURGERY
Payer: COMMERCIAL

## 2024-09-05 ENCOUNTER — ANESTHESIA EVENT (OUTPATIENT)
Dept: SURGERY | Facility: HOSPITAL | Age: 44
End: 2024-09-05
Payer: COMMERCIAL

## 2024-09-05 ENCOUNTER — ANESTHESIA (OUTPATIENT)
Dept: SURGERY | Facility: HOSPITAL | Age: 44
End: 2024-09-05
Payer: COMMERCIAL

## 2024-09-05 DIAGNOSIS — N63.20 LEFT BREAST MASS: ICD-10-CM

## 2024-09-05 DIAGNOSIS — R92.8 ABNORMAL MAMMOGRAM: ICD-10-CM

## 2024-09-05 DIAGNOSIS — N63.42 SUBAREOLAR MASS OF LEFT BREAST: Primary | ICD-10-CM

## 2024-09-05 PROCEDURE — 37000009 HC ANESTHESIA EA ADD 15 MINS: Performed by: SURGERY

## 2024-09-05 PROCEDURE — 19125 EXCISION BREAST LESION: CPT | Mod: LT,,, | Performed by: SURGERY

## 2024-09-05 PROCEDURE — 71000033 HC RECOVERY, INTIAL HOUR: Performed by: SURGERY

## 2024-09-05 PROCEDURE — 63600175 PHARM REV CODE 636 W HCPCS: Performed by: ANESTHESIOLOGY

## 2024-09-05 PROCEDURE — 63600175 PHARM REV CODE 636 W HCPCS: Performed by: SURGERY

## 2024-09-05 PROCEDURE — 37000008 HC ANESTHESIA 1ST 15 MINUTES: Performed by: SURGERY

## 2024-09-05 PROCEDURE — 63600175 PHARM REV CODE 636 W HCPCS: Mod: JZ,JG | Performed by: SURGERY

## 2024-09-05 PROCEDURE — 27201423 OPTIME MED/SURG SUP & DEVICES STERILE SUPPLY: Performed by: SURGERY

## 2024-09-05 PROCEDURE — 63600175 PHARM REV CODE 636 W HCPCS: Performed by: PHYSICIAN ASSISTANT

## 2024-09-05 PROCEDURE — 25000003 PHARM REV CODE 250: Performed by: ANESTHESIOLOGY

## 2024-09-05 PROCEDURE — 76098 X-RAY EXAM SURGICAL SPECIMEN: CPT | Mod: TC

## 2024-09-05 PROCEDURE — 71000015 HC POSTOP RECOV 1ST HR: Performed by: SURGERY

## 2024-09-05 PROCEDURE — 63600175 PHARM REV CODE 636 W HCPCS: Performed by: NURSE ANESTHETIST, CERTIFIED REGISTERED

## 2024-09-05 PROCEDURE — 25000003 PHARM REV CODE 250: Performed by: NURSE ANESTHETIST, CERTIFIED REGISTERED

## 2024-09-05 PROCEDURE — 36000706: Performed by: SURGERY

## 2024-09-05 PROCEDURE — 36000707: Performed by: SURGERY

## 2024-09-05 PROCEDURE — 71000016 HC POSTOP RECOV ADDL HR: Performed by: SURGERY

## 2024-09-05 RX ORDER — DIPHENHYDRAMINE HYDROCHLORIDE 50 MG/ML
25 INJECTION INTRAMUSCULAR; INTRAVENOUS EVERY 6 HOURS PRN
Status: DISCONTINUED | OUTPATIENT
Start: 2024-09-05 | End: 2024-09-05 | Stop reason: HOSPADM

## 2024-09-05 RX ORDER — CEFAZOLIN SODIUM 2 G/50ML
2 SOLUTION INTRAVENOUS
Status: DISCONTINUED | OUTPATIENT
Start: 2024-09-05 | End: 2024-09-05

## 2024-09-05 RX ORDER — BUPIVACAINE HYDROCHLORIDE 5 MG/ML
INJECTION, SOLUTION EPIDURAL; INTRACAUDAL
Status: DISCONTINUED
Start: 2024-09-05 | End: 2024-09-05 | Stop reason: HOSPADM

## 2024-09-05 RX ORDER — TRAMADOL HYDROCHLORIDE 50 MG/1
50 TABLET ORAL EVERY 4 HOURS PRN
Status: DISCONTINUED | OUTPATIENT
Start: 2024-09-05 | End: 2024-09-05 | Stop reason: HOSPADM

## 2024-09-05 RX ORDER — GABAPENTIN 300 MG/1
300 CAPSULE ORAL
Status: COMPLETED | OUTPATIENT
Start: 2024-09-05 | End: 2024-09-05

## 2024-09-05 RX ORDER — TRAMADOL HYDROCHLORIDE 50 MG/1
50 TABLET ORAL EVERY 6 HOURS
Qty: 12 TABLET | Refills: 0 | Status: SHIPPED | OUTPATIENT
Start: 2024-09-05 | End: 2024-09-08

## 2024-09-05 RX ORDER — LIDOCAINE HYDROCHLORIDE 10 MG/ML
1 INJECTION, SOLUTION EPIDURAL; INFILTRATION; INTRACAUDAL; PERINEURAL ONCE
Status: DISCONTINUED | OUTPATIENT
Start: 2024-09-05 | End: 2024-09-05 | Stop reason: HOSPADM

## 2024-09-05 RX ORDER — EPHEDRINE SULFATE 50 MG/ML
INJECTION, SOLUTION INTRAVENOUS
Status: DISCONTINUED | OUTPATIENT
Start: 2024-09-05 | End: 2024-09-05

## 2024-09-05 RX ORDER — LIDOCAINE HYDROCHLORIDE 10 MG/ML
INJECTION, SOLUTION EPIDURAL; INFILTRATION; INTRACAUDAL; PERINEURAL
Status: DISCONTINUED | OUTPATIENT
Start: 2024-09-05 | End: 2024-09-05

## 2024-09-05 RX ORDER — SODIUM CHLORIDE, SODIUM LACTATE, POTASSIUM CHLORIDE, CALCIUM CHLORIDE 600; 310; 30; 20 MG/100ML; MG/100ML; MG/100ML; MG/100ML
INJECTION, SOLUTION INTRAVENOUS CONTINUOUS
Status: DISCONTINUED | OUTPATIENT
Start: 2024-09-05 | End: 2024-09-05 | Stop reason: HOSPADM

## 2024-09-05 RX ORDER — FENTANYL CITRATE 50 UG/ML
INJECTION, SOLUTION INTRAMUSCULAR; INTRAVENOUS
Status: DISCONTINUED | OUTPATIENT
Start: 2024-09-05 | End: 2024-09-05

## 2024-09-05 RX ORDER — PROPOFOL 10 MG/ML
VIAL (ML) INTRAVENOUS
Status: DISCONTINUED | OUTPATIENT
Start: 2024-09-05 | End: 2024-09-05

## 2024-09-05 RX ORDER — METHOCARBAMOL 100 MG/ML
1000 INJECTION, SOLUTION INTRAMUSCULAR; INTRAVENOUS ONCE AS NEEDED
Status: COMPLETED | OUTPATIENT
Start: 2024-09-05 | End: 2024-09-05

## 2024-09-05 RX ORDER — SODIUM CHLORIDE, SODIUM GLUCONATE, SODIUM ACETATE, POTASSIUM CHLORIDE AND MAGNESIUM CHLORIDE 30; 37; 368; 526; 502 MG/100ML; MG/100ML; MG/100ML; MG/100ML; MG/100ML
INJECTION, SOLUTION INTRAVENOUS CONTINUOUS
Status: DISCONTINUED | OUTPATIENT
Start: 2024-09-05 | End: 2024-09-05 | Stop reason: HOSPADM

## 2024-09-05 RX ORDER — ONDANSETRON HYDROCHLORIDE 2 MG/ML
4 INJECTION, SOLUTION INTRAVENOUS EVERY 12 HOURS PRN
Status: CANCELLED | OUTPATIENT
Start: 2024-09-05

## 2024-09-05 RX ORDER — HYDROMORPHONE HYDROCHLORIDE 2 MG/ML
0.4 INJECTION, SOLUTION INTRAMUSCULAR; INTRAVENOUS; SUBCUTANEOUS EVERY 5 MIN PRN
Status: DISCONTINUED | OUTPATIENT
Start: 2024-09-05 | End: 2024-09-05 | Stop reason: HOSPADM

## 2024-09-05 RX ORDER — CEFAZOLIN SODIUM 1 G/3ML
INJECTION, POWDER, FOR SOLUTION INTRAMUSCULAR; INTRAVENOUS
Status: DISCONTINUED
Start: 2024-09-05 | End: 2024-09-05 | Stop reason: HOSPADM

## 2024-09-05 RX ORDER — MIDAZOLAM HYDROCHLORIDE 2 MG/2ML
2 INJECTION, SOLUTION INTRAMUSCULAR; INTRAVENOUS ONCE AS NEEDED
Status: COMPLETED | OUTPATIENT
Start: 2024-09-05 | End: 2024-09-05

## 2024-09-05 RX ORDER — ONDANSETRON 4 MG/1
4 TABLET, ORALLY DISINTEGRATING ORAL ONCE
Status: COMPLETED | OUTPATIENT
Start: 2024-09-05 | End: 2024-09-05

## 2024-09-05 RX ORDER — CEFAZOLIN SODIUM 1 G/3ML
2 INJECTION, POWDER, FOR SOLUTION INTRAMUSCULAR; INTRAVENOUS
Status: DISCONTINUED | OUTPATIENT
Start: 2024-09-05 | End: 2024-09-05 | Stop reason: HOSPADM

## 2024-09-05 RX ORDER — ACETAMINOPHEN 500 MG
1000 TABLET ORAL
Status: COMPLETED | OUTPATIENT
Start: 2024-09-05 | End: 2024-09-05

## 2024-09-05 RX ORDER — BUPIVACAINE HYDROCHLORIDE 5 MG/ML
INJECTION, SOLUTION EPIDURAL; INTRACAUDAL
Status: DISCONTINUED | OUTPATIENT
Start: 2024-09-05 | End: 2024-09-05 | Stop reason: HOSPADM

## 2024-09-05 RX ORDER — HYDROMORPHONE HYDROCHLORIDE 2 MG/ML
0.2 INJECTION, SOLUTION INTRAMUSCULAR; INTRAVENOUS; SUBCUTANEOUS EVERY 5 MIN PRN
Status: DISCONTINUED | OUTPATIENT
Start: 2024-09-05 | End: 2024-09-05

## 2024-09-05 RX ORDER — ONDANSETRON HYDROCHLORIDE 2 MG/ML
4 INJECTION, SOLUTION INTRAVENOUS DAILY PRN
Status: DISCONTINUED | OUTPATIENT
Start: 2024-09-05 | End: 2024-09-05 | Stop reason: HOSPADM

## 2024-09-05 RX ORDER — METOCLOPRAMIDE HYDROCHLORIDE 5 MG/ML
10 INJECTION INTRAMUSCULAR; INTRAVENOUS EVERY 10 MIN PRN
Status: DISCONTINUED | OUTPATIENT
Start: 2024-09-05 | End: 2024-09-05 | Stop reason: HOSPADM

## 2024-09-05 RX ADMIN — EPHEDRINE SULFATE 10 MG: 50 INJECTION INTRAVENOUS at 10:09

## 2024-09-05 RX ADMIN — ACETAMINOPHEN 1000 MG: 500 TABLET ORAL at 09:09

## 2024-09-05 RX ADMIN — CEFAZOLIN 2 G: 330 INJECTION, POWDER, FOR SOLUTION INTRAMUSCULAR; INTRAVENOUS at 10:09

## 2024-09-05 RX ADMIN — LIDOCAINE HYDROCHLORIDE 50 MG: 10 INJECTION, SOLUTION EPIDURAL; INFILTRATION; INTRACAUDAL; PERINEURAL at 10:09

## 2024-09-05 RX ADMIN — GABAPENTIN 300 MG: 300 CAPSULE ORAL at 09:09

## 2024-09-05 RX ADMIN — FENTANYL CITRATE 50 MCG: 50 INJECTION, SOLUTION INTRAMUSCULAR; INTRAVENOUS at 11:09

## 2024-09-05 RX ADMIN — ONDANSETRON 4 MG: 4 TABLET, ORALLY DISINTEGRATING ORAL at 09:09

## 2024-09-05 RX ADMIN — FENTANYL CITRATE 50 MCG: 50 INJECTION, SOLUTION INTRAMUSCULAR; INTRAVENOUS at 10:09

## 2024-09-05 RX ADMIN — METHOCARBAMOL 1000 MG: 100 INJECTION INTRAMUSCULAR; INTRAVENOUS at 11:09

## 2024-09-05 RX ADMIN — SODIUM CHLORIDE, POTASSIUM CHLORIDE, SODIUM LACTATE AND CALCIUM CHLORIDE: 600; 310; 30; 20 INJECTION, SOLUTION INTRAVENOUS at 11:09

## 2024-09-05 RX ADMIN — PROPOFOL 175 MG: 10 INJECTION, EMULSION INTRAVENOUS at 10:09

## 2024-09-05 RX ADMIN — MIDAZOLAM HYDROCHLORIDE 2 MG: 1 INJECTION, SOLUTION INTRAMUSCULAR; INTRAVENOUS at 09:09

## 2024-09-05 RX ADMIN — SODIUM CHLORIDE, POTASSIUM CHLORIDE, SODIUM LACTATE AND CALCIUM CHLORIDE: 600; 310; 30; 20 INJECTION, SOLUTION INTRAVENOUS at 09:09

## 2024-09-05 NOTE — DISCHARGE INSTRUCTIONS
BREAST SURGERY POST-OP INSTRUCTIONS  DR. NATTY LUDWIG PA-C     How do I care for my incisions?  You and your caregiver should look at your incision daily. Call your doctor if you see any redness or drainage from your incision.  You will be given a support bra, wear this for 24 hours a day (unless showering or sponge bathing) for comfort and to help decrease amount of swelling. If the bra fits too loose or too tight you may go to your local store a purchase a front opening sports bra that fits snug.   Your incision will be closed with sutures (stitches) under your skin. These sutures dissolve on their own, so they do not need to be removed.  · If you go home with Steri-StripsTM on your incision, they will loosen and fall off by themselves. If they havent fallen off within 14 days, you may remove them.  · If you go home with glue over your sutures (stitches), it will also loosen and peel off, similarly to the Steri-Strips.  ** If you have had a Mastectomy and/or Reconstruction and/or Axillary Lymph Node Dissection:  You may have 1-2 Neftali-Laird Drains in place. Please refer to the additional instructions discussing care of your drains.     Is it normal to feel new sensations?  As you are healing, you may feel a several different sensations in your breast. Tenderness, numbness, and twinges are common examples. These sensations usually come and go, and will lessen over time, usually within the first few months after surgery. As you continue to heal, you may feel scar tissue along your incision site. It will feel hard. This is common and will soften over the next several months.     Can I shower?  You can shower 24 hours after your surgery. Taking a warm shower is relaxing and can help decrease discomfort. Use soap when you shower and gently wash your incision. Pat the areas dry with a towel after showering, and leave your incision uncovered, unless you have drainage from your incision. If you  have drainage, call your doctors office.  Do not take tub baths, swim, or use hot tubs or saunas until you discuss it with your doctor at the first appointment after your surgery.    Will I have pain when I am home?  The length of time each person has pain or discomfort varies. You will be given a prescription for pain medication before you go home. Follow the guidelines below to manage your pain.  · Take your medication as directed and as needed.  · Icing the affected area can also alleviate pain symptoms (ice the affected area at least four times a day, 15-20 minutes at a time).  · Call your doctor if the pain medication prescribed for you doesnt relieve your pain.  · Do not drive or drink alcohol while you are taking prescription pain medication.  · As your incision heals, you will have less pain and need less pain medication. A mild pain reliever such as acetaminophen (Tylenol) or ibuprofen (Advil) will relieve aches and discomfort. However, large quantities of acetaminophen may be harmful to your liver. Do not take more acetaminophen than the amount directed on the bottle or as instructed by your doctor or nurse.  · Pain medication should help you as you resume your normal activities. Pain medication is most effective 30 to 45 minutes after taking it.  · Keep track of when you take your pain medication. Taking it when your pain first begins is more effective than waiting for the pain to get worse.  Pain medication may cause constipation (having fewer bowel movements than what is normal for you).    How can I prevent constipation?  · Go to the bathroom at the same time every day. Your body will get used to going at that time.  · If you feel the urge to go, do not put it off. Try to use the bathroom 5 to 15 minutes after meals.  · After breakfast is a good time to move your bowels because the reflexes in your colon are strongest then.  · Exercise if you can; walking is an excellent form of exercise.  · Drink 8  (8-ounce) glasses (2 liters) of liquids daily, if you can. Drink water, juices, soups, ice cream shakes, and other drinks that do not have caffeine. Beverages with caffeine, such as coffee and soda, pull fluid out of the body.  · Slowly increase the fiber in your diet to 25 to 35 grams per day. Fruits, vegetables, whole grains, and cereals contain fiber. If you have an ostomy or have had recent bowel surgery, check with your doctor or nurse before making any changes in your diet.  · Both over-the-counter and prescription medications are available to treat constipation. Start with 1 of the following over-the-counter medications first:  o Docusate sodium (Colace®) 100 mg. Take __1___ capsules __1___ time a day. This is a stool softener that causes few side effects. Do not take it with mineral oil.  o Polyethylene glycol (MiraLAX®) 17 grams daily.  o Senna (Senokot®) 2 tablets at bedtime. This is a stimulant laxative, which can cause cramping.  · If you havent had a bowel movement in 2 days, call your doctor or nurse.    Will I be able to eat?  You can resume eating when you go home after surgery. Eating a balanced diet high in protein will help you heal after surgery. Your diet should include a healthy protein source at each meal, as well as fruits, vegetables, and whole grains. If you have questions about your diet, ask to see a dietitian.    When is it safe for me to drive and what activities can I perform?  You may resume driving after surgery as long as you are not taking prescription pain medication that may make you drowsy, and you have your full range of motion.  You should not lift anything heavier than 10-15 lbs the first week. After this time, you may gradually increase the amount of weight. You want to take it easy the first 2 weeks, no strenuous or repetitive movements such as vacuuming or scrubbing. Walking is okay. Ask the doctor if you have questions.    How long until I have the pathology  results?  The pathology report usually takes to 7 to 10 business days.    When is my first appointment after my surgery?  You should be given or schedule a follow-up appointment 1 to 2 weeks after your surgery.    How can I cope with my feelings?   After surgery for a serious illness, you may have new and upsetting feelings. Many patients say they felt sad, worried, nervous, irritable, or angry at one time or another. You may find that you cannot control some of these feelings. If this happens, its a good idea to seek emotional support.  The first step in coping is to talk about how you feel. Family and friends can help. Your nurse, doctor, and  can reassure, support, and guide you. It is always a good idea to let these professionals know how you, your family, and your friends are feeling emotionally. Many resources are available to patients and their families. Whether you are in the hospital or at home, we are here to help you and your family and friends handle the emotional aspects of your illness.    What if I have other questions?  If you have any questions or concerns, please talk with your doctor or nurse. You can reach them Monday through Thursday from 9:00 AM to 5:00 PM and Friday from 9:00 AM to 12:00 PM at [phone].  After 5:00 PM M-Th or 12:00 PM Fri, during the weekend, and on holidays, please call [phone] and ask for the doctor on call.    PLEASE CALL YOUR DOCTOR OR NURSE IF YOU HAVE:  · A temperature of 101° F (38.3° C) or higher  · Shortness of breath  · Warmer than normal skin around your incision  · Increased discomfort in the area  · Increased redness around your incision  · New or increased swelling around your incision  · Discharge from your incision

## 2024-09-05 NOTE — ANESTHESIA PROCEDURE NOTES
Intubation    Date/Time: 9/5/2024 10:04 AM    Performed by: Star Wolf CRNA  Authorized by: Jomar Lay MD    Intubation:     Induction:  Intravenous    Mask Ventilation:  Easy mask    Attempted By:  CRNA    Difficult Airway Encountered?: No      Airway Device:  Supraglottic airway/LMA    Airway Device Size:  3.0    Placement Verified By:  Capnometry  Notes:      Atraumatic insertion

## 2024-09-05 NOTE — ANESTHESIA POSTPROCEDURE EVALUATION
Anesthesia Post Evaluation    Patient: Asia Sen    Procedure(s) Performed: Procedure(s) (LRB):  EXCISION,MASS,BREAST,USING RADIOLOGICAL MARKER ////left (Left)    Final Anesthesia Type: general      Patient location during evaluation: PACU  Patient participation: No - Unable to Participate, Sedation  Level of consciousness: sedated  Post-procedure vital signs: reviewed and stable  Pain management: adequate  Airway patency: patent  VIRY mitigation strategies: Multimodal analgesia  PONV status at discharge: No PONV  Anesthetic complications: no      Cardiovascular status: stable  Respiratory status: nasal cannula  Hydration status: euvolemic  Follow-up not needed.              Vitals Value Taken Time   /66 09/05/24 0856   Temp 36.5 °C (97.7 °F) 09/05/24 0856   Pulse 70 09/05/24 0856   Resp 18 09/05/24 1115   SpO2 98 % 09/05/24 0856         No case tracking events are documented in the log.      Pain/Gallito Score: Pain Rating Prior to Med Admin: 0 (9/5/2024  9:42 AM)

## 2024-09-05 NOTE — INTERVAL H&P NOTE
The patient has been examined and the H&P has been reviewed:    I concur with the findings and no changes have occurred since H&P was written.    Surgery risks, benefits and alternative options discussed and understood by patient/family.      All of questions were answered    Angelita Barrera MD  Breast Surgical Oncology

## 2024-09-05 NOTE — OP NOTE
DATE OF PROCEDURE: 9/5/2024    SURGEON: Angelita Barrera M.D.    ASSISTANT:  Surgeons and Role:     * Ben Howell MD - Assistant        PREOPERATIVE DIAGNOSIS: Subareolar mass of left breast [N63.42]     POSTOPERATIVE DIAGNOSIS: Post-Op Diagnosis Codes:     * Subareolar mass of left breast [N63.42]     ANESTHESIA: General Anesthesiologist: Jomar Lay MD  CRNA: Star Wolf CRNA     PROCEDURES PERFORMED:   1. Left breast excisional biopsy  EXCISION,MASS,BREAST,USING RADIOLOGICAL MARKER ////left:        PROCEDURE IN DETAIL:   Asia Sen is a 43 y.o. female brought to the operating room for definitive surgical management of recent core biopsy revealing left breast complex fibroadenoma. The patient has elected to undergo excisional biopsy for further evaluation. The patient was informed of the possible risks and complications of the procedure, including but not limited to anesthetic risks, bleeding, infection, and need for additional surgery. The patient concurred with the proposed plan, and has given informed consent. The site of surgery was properly noted/marked in the preoperative holding area.    The patient underwent informed consent.     She was then brought to the Operating Room and placed in the supine position. Anesthesia was administered. The left breast, anterior chest, arm and axilla were then prepped and draped in a sterile fashion.       Left Excisional Biopsy without Seed Localization  After localizing the seed with the SentiMag, a circumareolar was planned. Attention was turned to the left breast itself. The circumareolar incision was made in the left breast over the anticipated tract of the palpable mass lower subareolar area. The incision was deepened using electrocautery. The mass capsule was identified and entered. The mass was multi-lobulated. The specimen was was completely dissected free. The clip capsule was squeezed out, but identified. The specimen was submitted for specimen  radiograph. Specimen radiograph confirmed the seed, clip and area of interest were within the specimen.    Within the lumpectomy cavity, hemostasis was achieved with cautery. The cavity was irrigated until clear. There was no evidence of bleeding. It was closed in multiple layers with deep dermal and subcutaneous interrupted 3-0 Monocryl sutures and a running 4-0 Monocryl subcuticular skin closure. Dermabond was applied followed by sterile dressings.    All instruments and sponge counts were correct at the end of the procedure.         ESTIMATED BLOOD LOSS: 5 ml    Implants: * No implants in log *    Specimens:   Specimen (24h ago, onward)       Start     Ordered    09/05/24 1111  Specimen to Pathology  RELEASE UPON ORDERING        References:    Click here for ordering Quick Tip   Question:  Release to patient  Answer:  Immediate    09/05/24 1111                   ID Type Source Tests Collected by Time Destination   A : mass Tissue Breast, Left SPECIMEN TO PATHOLOGY Angelita Barrera MD 9/5/2024 1050                 Condition: Good    Disposition: PACU - hemodynamically stable.    Attestation: I performed the procedure.

## 2024-09-05 NOTE — BRIEF OP NOTE
[facility]  Brief Operative Note     SUMMARY     Surgery Date: 9/5/2024     Surgeon: Angelita Barrera MD    Assist: Jose Jama MD - Resident PGY-1    Pre-op Diagnosis:  Subareolar mass of left breast [N63.42]    Post-op Diagnosis:  Post-Op Diagnosis Codes:     * Subareolar mass of left breast [N63.42]    Procedure(s) (LRB):  EXCISION,MASS,BREAST,USING RADIOLOGICAL MARKER ////left (Left)    Anesthesia: General    Findings/Key Components: Multilobular mass, fibroadenoma like, excised    Estimated Blood Loss: 5 ml         Specimens:   Specimen (24h ago, onward)       Start     Ordered    09/05/24 1111  Specimen to Pathology  RELEASE UPON ORDERING        References:    Click here for ordering Quick Tip   Question:  Release to patient  Answer:  Immediate    09/05/24 1111                  * No specimens in log *    Discharge Note    SUMMARY     Admit Date: 9/5/2024    Discharge Date and Time:  09/05/2024 11:17 AM    Hospital Course (synopsis of major diagnoses, care, treatment, and services provided during the course of the hospital stay): No complications     Final Diagnosis: Post-Op Diagnosis Codes:     * Subareolar mass of left breast [N63.42]    Disposition: Home or Self Care    Follow Up/Patient Instructions:     Medications:  Reconciled Home Medications:      Medication List        START taking these medications      traMADoL 50 mg tablet  Commonly known as: ULTRAM  Take 1 tablet (50 mg total) by mouth every 6 (six) hours. for 3 days            Discharge Procedure Orders   Diet general     Ice to affected area     Lifting restrictions     Remove dressing in 24 hours   Order Comments: Leave glue and steri strips until clinic visit     Call MD for:  temperature >100.4     Call MD for:  persistent nausea and vomiting     Call MD for:  severe uncontrolled pain     Call MD for:  difficulty breathing, headache or visual disturbances     Call MD for:  redness, tenderness, or signs of infection  (pain, swelling, redness, odor or green/yellow discharge around incision site)     Call MD for:  hives     Call MD for:  persistent dizziness or light-headedness

## 2024-09-05 NOTE — ANESTHESIA PREPROCEDURE EVALUATION
"                                                                                                             09/05/2024  Asia Sen is a 43 y.o., female.presents with Left Breast Mass for Biopsy procedure below.  Diagnosis: Subareolar mass of left breast [N63.42]   Pre-op diagnosis: Subareolar mass of left breast [N63.42]         She comes to [facility] for the noted procedure under GA/LMA.  Case: [id number] Date/Time: 09/05/24 1031   Procedure: EXCISION,MASS,BREAST,USING RADIOLOGICAL MARKER ////left (Left)   Anesthesia type: General         PMHx:  Negative      PSHx:  Surgical History:  AUGMENTATION OF BREAST COSMETIC SURGERY         Vital signs:  BP: 100/66 Pulse: 70   Resp: SpO2: 98   Temp: 36.5 °C (97.7 °F)   Height: 5' 6" (1.676 m) (09/05/24) Weight: 65.4 kg (144 lb 2.9 oz) (09/05/24)   BMI: 23.3 IBW: 59.3 kg (130 lb 10.4 oz)   Last edited 09/05/24 0856 by HARRIS      Lab Data:            Pre-op Assessment    I have reviewed the Patient Summary Reports.     I have reviewed the Nursing Notes. I have reviewed the NPO Status.   I have reviewed the Medications.     Review of Systems  Anesthesia Hx:  No problems with previous Anesthesia                Social:  Non-Smoker       Hematology/Oncology:  Hematology Normal   Oncology Normal                                   EENT/Dental:  EENT/Dental Normal           Cardiovascular:  Cardiovascular Normal Exercise tolerance: good                   Functional Capacity good / => 4 METS                         Pulmonary:  Pulmonary Normal                       Renal/:  Renal/ Normal                 Hepatic/GI:  Hepatic/GI Normal                 Musculoskeletal:  Musculoskeletal Normal                Neurological:  Neurology Normal                                      Endocrine:  Endocrine Normal            Dermatological:  Skin Normal    Psych:  Psychiatric Normal                    Physical Exam  General: Alert, Oriented, Well nourished and Cooperative    Airway:  Mallampati: II "   Mouth Opening: Normal  TM Distance: Normal  Tongue: Normal  Neck ROM: Normal ROM    Dental:  Intact    Chest/Lungs:  Clear to auscultation, Normal Respiratory Rate    Heart:  Rate: Normal  Rhythm: Regular Rhythm        Anesthesia Plan  Type of Anesthesia, risks & benefits discussed:    Anesthesia Type: Gen Supraglottic Airway  Intra-op Monitoring Plan: Standard ASA Monitors  Post Op Pain Control Plan: multimodal analgesia and IV/PO Opioids PRN  Induction:  IV  Airway Plan: Direct  Informed Consent: Informed consent signed with the Patient and all parties understand the risks and agree with anesthesia plan.  All questions answered. Patient consented to blood products? Yes  ASA Score: 1  Day of Surgery Review of History & Physical: H&P Update referred to the surgeon/provider.    Ready For Surgery From Anesthesia Perspective.     .

## 2024-09-06 VITALS
WEIGHT: 144.19 LBS | HEART RATE: 69 BPM | RESPIRATION RATE: 16 BRPM | HEIGHT: 66 IN | DIASTOLIC BLOOD PRESSURE: 71 MMHG | OXYGEN SATURATION: 95 % | TEMPERATURE: 98 F | BODY MASS INDEX: 23.17 KG/M2 | SYSTOLIC BLOOD PRESSURE: 106 MMHG

## 2024-09-06 LAB — PSYCHE PATHOLOGY RESULT: NORMAL

## 2024-09-10 ENCOUNTER — TELEPHONE (OUTPATIENT)
Dept: SURGERY | Facility: CLINIC | Age: 44
End: 2024-09-10
Payer: COMMERCIAL

## 2024-09-10 NOTE — TELEPHONE ENCOUNTER
Patient called with her pathology results.      ----- Message from Angelita Barrera MD sent at 9/9/2024  2:06 PM CDT -----  Please call the patient and inform pathology results revealed benign findings and no evidence of cancer. Further discussion will be had at their post-op/follow-up appointment.

## 2024-09-16 ENCOUNTER — OFFICE VISIT (OUTPATIENT)
Dept: SURGERY | Facility: CLINIC | Age: 44
End: 2024-09-16
Payer: COMMERCIAL

## 2024-09-16 VITALS
TEMPERATURE: 98 F | OXYGEN SATURATION: 100 % | HEIGHT: 66 IN | SYSTOLIC BLOOD PRESSURE: 105 MMHG | WEIGHT: 147.19 LBS | RESPIRATION RATE: 18 BRPM | DIASTOLIC BLOOD PRESSURE: 72 MMHG | BODY MASS INDEX: 23.65 KG/M2 | HEART RATE: 73 BPM

## 2024-09-16 DIAGNOSIS — Z12.31 VISIT FOR SCREENING MAMMOGRAM: ICD-10-CM

## 2024-09-16 DIAGNOSIS — D24.2 FIBROADENOMA, LEFT: Primary | ICD-10-CM

## 2024-09-16 PROCEDURE — 3074F SYST BP LT 130 MM HG: CPT | Mod: CPTII,S$GLB,, | Performed by: SURGERY

## 2024-09-16 PROCEDURE — 1159F MED LIST DOCD IN RCRD: CPT | Mod: CPTII,S$GLB,, | Performed by: SURGERY

## 2024-09-16 PROCEDURE — 3078F DIAST BP <80 MM HG: CPT | Mod: CPTII,S$GLB,, | Performed by: SURGERY

## 2024-09-16 PROCEDURE — 99999 PR PBB SHADOW E&M-EST. PATIENT-LVL III: CPT | Mod: PBBFAC,,,

## 2024-09-16 PROCEDURE — 99024 POSTOP FOLLOW-UP VISIT: CPT | Mod: S$GLB,,, | Performed by: SURGERY

## 2024-09-16 NOTE — PROGRESS NOTES
[facility] Breast [facility]urg  Breast Surgical Oncology  Follow-Up Patient Office Visit         PCP: Yasir Vo MD     Other Care Providers: Dr Angelita Barrera, breast surgical oncologist    Patient Care Team:  Yasir Vo MD as PCP - General (Family Medicine)  Jay Riddle Jr., MD as Consulting Physician (Obstetrics and Gynecology)  Raquel Decker MD as Consulting Physician (General Surgery)      Chief Complaint:   Chief Complaint   Patient presents with    Post-op Evaluation     PostOp F/U for Left Excisional Breast Biopsy: patient reports no redness, no swelling, no signs of infection        Subjective:   Treatment History:        Interval History:  2024 - Asia Sen     HPI:  Asia Sen is a 43 y.o. female who presents on 2024 for follow up from left breast excisional biopsy performed on 24 by Dr. Barrera. She reports no issues, no need for pain medication, no wound concerns.       Imaging:   Last diag mmg/us:24 at [facility]  US guided CNA 24 revealing complex fibroadenoma  Radiographic specimen with surgery on 24 confirming lesion removed  Pathology:  Complex fibroadenoma, no atypia  OB/GYN History:  Age at Menarche Onset: 13  Menopausal Status: premenopausal, LMP: Patient's last menstrual period was 2024 (approximate).  Hysterectomy/Oophorectomy: n/a,  Pregnancy History:   Age at first live birth: 38  Hormone Replacement Therapy: No, none  Patient denies nipple discharge.   Patient admits to to previous breast biopsy.   Patient denies to a personal history of breast cancer.    Other Relevant History:  Prior thoracic RT: none  Genetic testing: no  Ashkenazi Lutheran descent: No    Family History:  Family History   Problem Relation Name Age of Onset    Cancer Mother Giulia         Kidney Cancer    Hypertension Mother Giulia     Stroke Mother Giulia         TIA    Depression Mother Giulia     Early death Mother Giulia     Heart  "disease Mother Giulia     Miscarriages / Stillbirths Mother Giulia     Alcohol abuse Father Oseas     Drug abuse Father Oseas     Early death Father Oseas         Past History:  Past Medical History:   Diagnosis Date    Adenoma of breast     left        Past Surgical History:   Procedure Laterality Date    AUGMENTATION OF BREAST  2006    Under Muscle Saline    BREAST SURGERY  2024    Lumpectomy Left Breast    COSMETIC SURGERY  2006    Breast Implants    EXCISION, MASS, BREAST, USING RADIOLOGICAL MARKER Left 2024    Procedure: EXCISION,MASS,BREAST,USING RADIOLOGICAL MARKER ////left;  Surgeon: Angelita Barrera MD;  Location: [facility];  Service: General;  Laterality: Left;        Social History     Socioeconomic History    Marital status:    Tobacco Use    Smoking status: Former     Current packs/day: 0.00     Types: Cigarettes     Quit date: 2018     Years since quittin.3    Smokeless tobacco: Never    Tobacco comments:     Occasionally on and off in my 20s and 30s quit at 37   Substance and Sexual Activity    Alcohol use: Not Currently     Comment: Occasionally not daily nor weekly    Drug use: Never    Sexual activity: Yes     Partners: Male     Birth control/protection: Partner-Vasectomy     Comment:         Body mass index is 23.76 kg/m².     Allergy/Medications:   Review of patient's allergies indicates:  No Known Allergies     No current outpatient medications on file.       Review of Systems:  Review of Systems   Constitutional:  Negative for chills and fever.   Gastrointestinal:  Negative for nausea and vomiting.   Musculoskeletal:  Negative for myalgias.   Skin:  Negative for itching and rash.           Objective:     Vitals:  Blood pressure 105/72, pulse 73, temperature 98.1 °F (36.7 °C), temperature source Oral, resp. rate 18, height 5' 6" (1.676 m), weight 66.8 kg (147 lb 3.2 oz), last menstrual period 2024, SpO2 100%.      Physical Exam:  General: The " patient is awake, alert and oriented times three. The patient is well nourished and in no acute distress.     Breast: post op exam    Left: Examination of the left breast fails to reveal any dominant masses or areas of significant focal nodularity. The nipple is everted without evidence of discharge. Steristrips were inplace at inferior border of areola, removed by myself and incision is clean dry and intact with no drainage. She has no areas of fluctuance, no evidence of seroma or hematoma, healing well  Assessment and Plan:     S/p left breast excisional biopsy of benign tissue(5.6cm fibroadenoma), she is doing very well post operatively.            Plan:   Return to yearly screening MMG, can follow up with us as needed.   She may gradually return to her normal daily activities with her left upper extremity, advise to continue compressive bra for full month following procedure.         All of questions were answered, encouraged to call with questions or concerns.     Raquel Decker MD  Breast Surgery    OFFICE VISIT CODING:    Non-face-to-face time included:  Yes Preparing to see the patient such as reviewing the patient record  Yes Obtaining and reviewing separately obtained history  Yes Independently interpreting results  Yes Documenting clinical information in electronic health record  No Ordering appropriate medications  No Ordering appropriate tests  No Ordering appropriate procedures (including follow-up)  No Referring and communicating with other health care professionals (not separately reported)  Yes Care Coordination (not separately reported)    Face-to-face time included:  Yes Performing a medically necessary appropriate history, examination, and/or evaluation  Yes Communicating results to the patient/family/caregiver  Yes Counseling and educating the patient/family/caregiver  Yes Answering patient/family/caregiver questions    Total Time: 20 minutes    Total time includes both face-to-face and  non-face-to-face time personally spent by myself on the day of the visit.

## 2025-08-04 ENCOUNTER — TELEPHONE (OUTPATIENT)
Dept: SURGERY | Facility: CLINIC | Age: 45
End: 2025-08-04
Payer: COMMERCIAL

## (undated) DEVICE — SOL IRRI STRL WATER 1000ML

## (undated) DEVICE — STRIP MEDI WND CLSR 1/2X4IN

## (undated) DEVICE — SUPPORT ULNA NERVE PROTECTOR

## (undated) DEVICE — CHARM MARGINMAP SPEC 5MM

## (undated) DEVICE — SPONGE LAP 18X18 PREWASHED

## (undated) DEVICE — NDL HYPO REG 25G X 1 1/2

## (undated) DEVICE — SUT STRATAFIX 4-0 30CM PS-2

## (undated) DEVICE — GLOVE SENSICARE PI MICRO 6

## (undated) DEVICE — NDL SYR 10ML 18X1.5 LL BLUNT

## (undated) DEVICE — ADHESIVE DERMABOND ADVANCED

## (undated) DEVICE — ILLUMINATOR PHOTONBLADE 8/11IN

## (undated) DEVICE — DRESSING TRANS 4X4 TEGADERM

## (undated) DEVICE — ELECTRODE PATIENT RETURN DISP

## (undated) DEVICE — SUT 2/0 30IN SILK BLK BRAI

## (undated) DEVICE — PAD PREP CUFFED NS 24X48IN

## (undated) DEVICE — Device

## (undated) DEVICE — SPONGE COTTON TRAY 4X4IN